# Patient Record
(demographics unavailable — no encounter records)

---

## 2024-10-28 NOTE — DISCUSSION/SUMMARY
[de-identified] : I personally reviewed the MRI/CT scan images and agree with the radiologist's report. The radiological findings were discussed with the patient  proceed with lesi l4-5  if no rleief consdier left l2-5 mbb

## 2024-10-28 NOTE — PHYSICAL EXAM

## 2024-10-28 NOTE — HISTORY OF PRESENT ILLNESS
[FreeTextEntry1] : pt states she is having pain in the lower back [] : no [FreeTextEntry7] : middle  [de-identified] : getting up in the morning

## 2024-10-28 NOTE — PHYSICAL EXAM

## 2024-10-28 NOTE — DISCUSSION/SUMMARY
[de-identified] : I personally reviewed the MRI/CT scan images and agree with the radiologist's report. The radiological findings were discussed with the patient  proceed with lesi l4-5  if no rleief consdier left l2-5 mbb

## 2024-10-28 NOTE — HISTORY OF PRESENT ILLNESS
[FreeTextEntry1] : pt states she is having pain in the lower back [] : no [FreeTextEntry7] : middle  [de-identified] : getting up in the morning

## 2025-01-15 NOTE — PROCEDURE
[FreeTextEntry3] : Date of Service: 01/15/2025   Account: [id number]   Patient: DANIELLE BOWSER   YOB: 1947   Age: 77 year     Surgeon:                                   Manoj Navarrete M.D.   Pre-Operative Diagnosis:       Lumbosacral radiculitis                Post Operative Diagnosis:     Lumbosacral radiculitis                Procedure:                                Interlaminar lumbar epidural steroid injection (L4-5) under fluoroscopic guidance   Anesthesia:                            MAC     This procedure was carried out using fluoroscopic guidance.  The risks and benefits of the procedure were discussed extensively with the patient.  The consent of the patient was obtained and the following procedure was performed.   The patient was placed in the prone position.  The lumbar area was prepped and draped in a sterile fashion.  Under AP view with slight cephalad-caudad angulation, the L4-5 interspace was identified and marked.  Using sterile technique the superficial skin was anesthetized with 1% Lidocaine without epinephrine.  A 20 gauge Tuohoy needle was advanced into the epidural space under fluoroscopy using ukskx-vekvguftp-[id number] technique and using loss of resistance at the L4-5 level.  After negative aspiration for heme or CSF, an epidurogram was obtained using 3 cc Omnipaque contrast confirming epidural placement of the needle.   Lumbar epidurogram showed no intrathecal or intravascular spread and showed adequate bilateral epidural spread from L1 to S1 levels.   After this, 5 cc of preservative free normal saline and 80 mg of kenalog were injected into the epidural space.   The needle was subsequently removed.  Anesthesia personnel were present throughout the procedure.   The patient tolerated the procedure well and was instructed to contact me immediately if there were any problems.     Manoj Navarrete M.D.

## 2025-01-30 NOTE — PHYSICAL EXAM
[de-identified] : PHYSICAL EXAM  Constitutional:  Appears well, no apparent distress Ability to communicate: Normal Respiratory: non-labored breathing Skin: no rash noted Head: normocephalic, atraumatic Neck: no visible thyroid enlargement Eyes: extraocular movements intact Neurologic: alert and oriented x3 Psychiatric: normal mood, affect, and behavior   Back, including spine: Range of motion of the thoracic and lumbar spine is as follows: Diminished range of motion in all planes.  MMT 5/5 BL LE.  Sensation is intact to light touch and pin prick BL LE.  Negative Straight leg raise testing bilaterally.  Negatvie Kemps maneuver bilaterally.  Normal Gait.   Assessment: Lumbago  Plan: After discussing various treatment options with the patient including but not limited to oral medications, physical therapy, exercise modalities as well as interventional spinal injections, we have decided with the following plan:   Continue home exercises, stretching, activity modification, physical therapy, and conservative care.

## 2025-01-30 NOTE — HISTORY OF PRESENT ILLNESS
[Lower back] : lower back [8] : 8 [7] : 7 [Radiating] : radiating [Intermittent] : intermittent [Household chores] : household chores [Leisure] : leisure [Social interactions] : social interactions [Sitting] : sitting [2] : 2 [FreeTextEntry1] : L4-5 DELMA-01/15/2025 [] : no [FreeTextEntry7] : middle  [de-identified] : getting up in the morning

## 2025-01-30 NOTE — ASSESSMENT
[FreeTextEntry1] : 80% relief with pp improved ROM and ADLS  Pt. presents with 80% Relief of pain and improvement in ROM and ADLS post injection.  Able to sit, stand, walk, sleep for longer periods of time.

## 2025-01-30 NOTE — PHYSICAL EXAM
[de-identified] : PHYSICAL EXAM  Constitutional:  Appears well, no apparent distress Ability to communicate: Normal Respiratory: non-labored breathing Skin: no rash noted Head: normocephalic, atraumatic Neck: no visible thyroid enlargement Eyes: extraocular movements intact Neurologic: alert and oriented x3 Psychiatric: normal mood, affect, and behavior   Back, including spine: Range of motion of the thoracic and lumbar spine is as follows: Diminished range of motion in all planes.  MMT 5/5 BL LE.  Sensation is intact to light touch and pin prick BL LE.  Negative Straight leg raise testing bilaterally.  Negatvie Kemps maneuver bilaterally.  Normal Gait.   Assessment: Lumbago  Plan: After discussing various treatment options with the patient including but not limited to oral medications, physical therapy, exercise modalities as well as interventional spinal injections, we have decided with the following plan:   Continue home exercises, stretching, activity modification, physical therapy, and conservative care.

## 2025-01-30 NOTE — HISTORY OF PRESENT ILLNESS
[Lower back] : lower back [8] : 8 [7] : 7 [Radiating] : radiating [Intermittent] : intermittent [Household chores] : household chores [Leisure] : leisure [Social interactions] : social interactions [Sitting] : sitting [2] : 2 [FreeTextEntry1] : L4-5 DELMA-01/15/2025 [] : no [FreeTextEntry7] : middle  [de-identified] : getting up in the morning